# Patient Record
Sex: MALE | Race: WHITE
[De-identification: names, ages, dates, MRNs, and addresses within clinical notes are randomized per-mention and may not be internally consistent; named-entity substitution may affect disease eponyms.]

---

## 2017-06-15 ENCOUNTER — HOSPITAL ENCOUNTER (OUTPATIENT)
Dept: HOSPITAL 61 - RT | Age: 47
Discharge: HOME | End: 2017-06-15
Attending: PHYSICIAN ASSISTANT
Payer: COMMERCIAL

## 2017-06-15 DIAGNOSIS — G47.33: Primary | ICD-10-CM

## 2017-06-16 NOTE — SLEEP
DATE OF STUDY:  06/15/2017



HOME SLEEP STUDY



DATE OF STUDY:  06/15/2017



REFERRING PERSON:  Dr. Chen Mallory.



The patient is 46 years old with a BMI of 35.  Sussex score was 12.  Home sleep

study was performed by [facility].  Total recording time was 677

minutes.



During the night study, the patient had 107 obstructive apneas, 56 mixed apneas,

and no central apneas.  There were 104 hypopneas.  The patient's apnea-hypopnea

index was 24 per hour with a supine index of 58 per hour.  There was some

artifact in thoracic belt, on and off throughout, the later part of the night.



Review of nocturnal oximetry study revealed that 130 minutes were spent in

oxygen saturation of less than 90%.  The lowest was 69% with an average of 90%.



Mean heart rate was 73 beats per minute.



IMPRESSION:

1.  Moderate sleep apnea-hypopnea syndrome with worsening during supine sleep.

2.  Severe nocturnal hypoxia secondary to obstructive sleep apnea.



RECOMMENDATIONS:

1.  The patient would benefit from in-lab CPAP titration study.

2.  Once optimal CPAP pressure achieved, then follow up in 4-6 weeks to assess

compliance with CPAP and to document clinical improvement.

3.  Weight loss is strongly advised.

4.  Avoid CNS depressants.

5.  Caution regarding driving until symptoms of sleep apnea resolve with the use

of CPAP.

 



______________________________

JULIA MAYA MD DR:  FLORIDALMA/brandy  JOB#:  [id number] / [id number]

DD:  06/16/2017 12:43  DT:  06/16/2017 13:13



CHEN Valentin

## 2018-06-07 ENCOUNTER — HOSPITAL ENCOUNTER (OUTPATIENT)
Dept: HOSPITAL 61 - NM | Age: 48
Discharge: HOME | End: 2018-06-07
Attending: PHYSICIAN ASSISTANT
Payer: COMMERCIAL

## 2018-06-07 DIAGNOSIS — R42: ICD-10-CM

## 2018-06-07 DIAGNOSIS — R06.02: Primary | ICD-10-CM

## 2018-06-07 PROCEDURE — 93017 CV STRESS TEST TRACING ONLY: CPT

## 2018-06-07 PROCEDURE — 93880 EXTRACRANIAL BILAT STUDY: CPT

## 2018-07-23 ENCOUNTER — HOSPITAL ENCOUNTER (OUTPATIENT)
Dept: HOSPITAL 61 - MAMMO | Age: 48
Discharge: HOME | End: 2018-07-23
Attending: FAMILY MEDICINE
Payer: COMMERCIAL

## 2018-07-23 DIAGNOSIS — N62: Primary | ICD-10-CM

## 2018-07-23 PROCEDURE — 76641 ULTRASOUND BREAST COMPLETE: CPT

## 2018-07-23 PROCEDURE — 77066 DX MAMMO INCL CAD BI: CPT

## 2019-10-12 ENCOUNTER — HOSPITAL ENCOUNTER (INPATIENT)
Dept: HOSPITAL 61 - ER | Age: 49
LOS: 1 days | Discharge: HOME | DRG: 694 | End: 2019-10-13
Attending: INTERNAL MEDICINE | Admitting: INTERNAL MEDICINE
Payer: COMMERCIAL

## 2019-10-12 VITALS — SYSTOLIC BLOOD PRESSURE: 141 MMHG | DIASTOLIC BLOOD PRESSURE: 89 MMHG

## 2019-10-12 VITALS — SYSTOLIC BLOOD PRESSURE: 150 MMHG | DIASTOLIC BLOOD PRESSURE: 64 MMHG

## 2019-10-12 VITALS — BODY MASS INDEX: 37.03 KG/M2 | HEIGHT: 69 IN | WEIGHT: 250 LBS

## 2019-10-12 VITALS — SYSTOLIC BLOOD PRESSURE: 188 MMHG | DIASTOLIC BLOOD PRESSURE: 112 MMHG

## 2019-10-12 VITALS — DIASTOLIC BLOOD PRESSURE: 72 MMHG | SYSTOLIC BLOOD PRESSURE: 166 MMHG

## 2019-10-12 DIAGNOSIS — E11.22: ICD-10-CM

## 2019-10-12 DIAGNOSIS — Z87.442: ICD-10-CM

## 2019-10-12 DIAGNOSIS — Z83.3: ICD-10-CM

## 2019-10-12 DIAGNOSIS — N13.2: Primary | ICD-10-CM

## 2019-10-12 DIAGNOSIS — N18.3: ICD-10-CM

## 2019-10-12 DIAGNOSIS — I12.9: ICD-10-CM

## 2019-10-12 DIAGNOSIS — N17.9: ICD-10-CM

## 2019-10-12 DIAGNOSIS — I16.0: ICD-10-CM

## 2019-10-12 DIAGNOSIS — Z79.4: ICD-10-CM

## 2019-10-12 LAB
ALBUMIN SERPL-MCNC: 3.9 G/DL (ref 3.4–5)
ALBUMIN/GLOB SERPL: 1.1 {RATIO} (ref 1–1.7)
ALP SERPL-CCNC: 44 U/L (ref 46–116)
ALT SERPL-CCNC: 40 U/L (ref 16–63)
ANION GAP SERPL CALC-SCNC: 11 MMOL/L (ref 6–14)
APTT PPP: YELLOW S
AST SERPL-CCNC: 20 U/L (ref 15–37)
BACTERIA #/AREA URNS HPF: 0 /HPF
BASOPHILS # BLD AUTO: 0 X10^3/UL (ref 0–0.2)
BASOPHILS NFR BLD: 1 % (ref 0–3)
BILIRUB SERPL-MCNC: 0.3 MG/DL (ref 0.2–1)
BILIRUB UR QL STRIP: NEGATIVE
BUN SERPL-MCNC: 22 MG/DL (ref 8–26)
BUN/CREAT SERPL: 12 (ref 6–20)
CALCIUM SERPL-MCNC: 9.1 MG/DL (ref 8.5–10.1)
CHLORIDE SERPL-SCNC: 105 MMOL/L (ref 98–107)
CO2 SERPL-SCNC: 26 MMOL/L (ref 21–32)
CREAT SERPL-MCNC: 1.9 MG/DL (ref 0.7–1.3)
EOSINOPHIL NFR BLD: 0.1 X10^3/UL (ref 0–0.7)
EOSINOPHIL NFR BLD: 1 % (ref 0–3)
ERYTHROCYTE [DISTWIDTH] IN BLOOD BY AUTOMATED COUNT: 13.9 % (ref 11.5–14.5)
FIBRINOGEN PPP-MCNC: CLEAR MG/DL
GFR SERPLBLD BASED ON 1.73 SQ M-ARVRAT: 38 ML/MIN
GLOBULIN SER-MCNC: 3.7 G/DL (ref 2.2–3.8)
GLUCOSE SERPL-MCNC: 222 MG/DL (ref 70–99)
HCT VFR BLD CALC: 38.2 % (ref 39–53)
HGB BLD-MCNC: 12.6 G/DL (ref 13–17.5)
LIPASE: 70 U/L (ref 73–393)
LYMPHOCYTES # BLD: 1.8 X10^3/UL (ref 1–4.8)
LYMPHOCYTES NFR BLD AUTO: 23 % (ref 24–48)
MCH RBC QN AUTO: 28 PG (ref 25–35)
MCHC RBC AUTO-ENTMCNC: 33 G/DL (ref 31–37)
MCV RBC AUTO: 85 FL (ref 79–100)
MONO #: 0.5 X10^3/UL (ref 0–1.1)
MONOCYTES NFR BLD: 6 % (ref 0–9)
NEUT #: 5.2 X10^3/UL (ref 1.8–7.7)
NEUTROPHILS NFR BLD AUTO: 69 % (ref 31–73)
NITRITE UR QL STRIP: NEGATIVE
PH UR STRIP: 5.5 [PH]
PLATELET # BLD AUTO: 258 X10^3/UL (ref 140–400)
POTASSIUM SERPL-SCNC: 4 MMOL/L (ref 3.5–5.1)
PROT SERPL-MCNC: 7.6 G/DL (ref 6.4–8.2)
PROT UR STRIP-MCNC: NEGATIVE MG/DL
RBC # BLD AUTO: 4.49 X10^6/UL (ref 4.3–5.7)
RBC #/AREA URNS HPF: (no result) /HPF (ref 0–2)
SODIUM SERPL-SCNC: 142 MMOL/L (ref 136–145)
SQUAMOUS #/AREA URNS LPF: (no result) /LPF
UROBILINOGEN UR-MCNC: 0.2 MG/DL
WBC # BLD AUTO: 7.6 X10^3/UL (ref 4–11)
WBC #/AREA URNS HPF: 0 /HPF (ref 0–4)

## 2019-10-12 PROCEDURE — 85025 COMPLETE CBC W/AUTO DIFF WBC: CPT

## 2019-10-12 PROCEDURE — 96361 HYDRATE IV INFUSION ADD-ON: CPT

## 2019-10-12 PROCEDURE — G0378 HOSPITAL OBSERVATION PER HR: HCPCS

## 2019-10-12 PROCEDURE — 96375 TX/PRO/DX INJ NEW DRUG ADDON: CPT

## 2019-10-12 PROCEDURE — 82962 GLUCOSE BLOOD TEST: CPT

## 2019-10-12 PROCEDURE — 74176 CT ABD & PELVIS W/O CONTRAST: CPT

## 2019-10-12 PROCEDURE — 36415 COLL VENOUS BLD VENIPUNCTURE: CPT

## 2019-10-12 PROCEDURE — 81001 URINALYSIS AUTO W/SCOPE: CPT

## 2019-10-12 PROCEDURE — 96374 THER/PROPH/DIAG INJ IV PUSH: CPT

## 2019-10-12 PROCEDURE — 80053 COMPREHEN METABOLIC PANEL: CPT

## 2019-10-12 PROCEDURE — 83036 HEMOGLOBIN GLYCOSYLATED A1C: CPT

## 2019-10-12 PROCEDURE — 83690 ASSAY OF LIPASE: CPT

## 2019-10-12 RX ADMIN — ONDANSETRON PRN MG: 2 INJECTION INTRAMUSCULAR; INTRAVENOUS at 09:41

## 2019-10-12 RX ADMIN — HYDROMORPHONE HYDROCHLORIDE PRN MG: 2 INJECTION INTRAMUSCULAR; INTRAVENOUS; SUBCUTANEOUS at 04:36

## 2019-10-12 RX ADMIN — GLIMEPIRIDE SCH MG: 2 TABLET ORAL at 12:44

## 2019-10-12 RX ADMIN — ONDANSETRON PRN MG: 2 INJECTION INTRAMUSCULAR; INTRAVENOUS at 15:49

## 2019-10-12 RX ADMIN — BACITRACIN SCH MLS/HR: 5000 INJECTION, POWDER, FOR SOLUTION INTRAMUSCULAR at 23:00

## 2019-10-12 RX ADMIN — HYDROMORPHONE HYDROCHLORIDE PRN MG: 2 INJECTION INTRAMUSCULAR; INTRAVENOUS; SUBCUTANEOUS at 05:01

## 2019-10-12 RX ADMIN — TAMSULOSIN HYDROCHLORIDE SCH MG: 0.4 CAPSULE ORAL at 12:44

## 2019-10-12 RX ADMIN — LOSARTAN POTASSIUM SCH MG: 50 TABLET ORAL at 12:44

## 2019-10-12 RX ADMIN — BACITRACIN SCH MLS/HR: 5000 INJECTION, POWDER, FOR SOLUTION INTRAMUSCULAR at 09:45

## 2019-10-12 NOTE — HP
ADMIT DATE:  10/12/2019



CHIEF COMPLAINT:  Flank pain.



HISTORY OF PRESENT ILLNESS:  A 48-year-old black male, insulin-dependent

diabetic with hypertension, who came in with abrupt onset of 4 hours of left

flank and lower abdominal pain.  He has no history of ureteral stones, but CT

scan showed left hydronephrosis and no obvious stone was seen despite the

hydroureter.  His pain seems a little less at this point when compared to

admission.  He denies dysuria, fever, chills, hematuria, or other complaints.



MEDICATIONS:  He is on antihypertensives, atorvastatin, and Lantus 20 units,

which was started about 3 months ago when Trulicity failed after 1 month.  He

also takes glimepiride for diabetes.



Last A1c was 12.6 in May of this year.



ALLERGIES:  No allergies.



SOCIAL HISTORY:  .  Nonsmoker, nondrinker, physically active.



FAMILY HISTORY:  Unremarkable.



REVIEW OF SYSTEMS:  No other complaints.



OBJECTIVE:

ENT:  All within normal limits.

NECK:  No masses, nodes, or bruits.

LUNGS:  Clear.

CARDIOVASCULAR:  Regular rate.  No tachycardia.

ABDOMEN:  Mildly tender in left upper and lower quadrants.  No guarding.

EXTREMITIES:  Good pedal and radial pulses.  No joint or skin lesions.

BACK:  Tender to mild percussion over the left flank.



ASSESSMENT:  Left hydronephrosis and hydroureter, likely secondary to distal

ureteral stone that may have already passed.  Chronic kidney disease 3 is worse

than it was 5 months ago, but may be acute related to the obstruction.  Last

creatinine was 1.65.  Insulin appears to be better controlled since on low dose

Lantus about 4 months ago.



PLAN:  Continue hydration and we will follow his renal function.  Resume home

meds and he is aware that there is no urologic services available and if he has

persistent hydroureter, he will need to be transferred for ureteral

instrumentation.

 



______________________________

BENITA DICK MD



DR:  ALEX/brandy  JOB#:  [id number] / [id number]

DD:  10/12/2019 11:56  DT:  10/12/2019 12:13

## 2019-10-12 NOTE — HP
ADMIT DATE:  10/12/2019



CHIEF COMPLAINT:  Abdominal pain, nausea, urinary discomfort.



HISTORY OF PRESENT ILLNESS:  The patient is a pleasant 48-year-old male who

presented to the ER with signs and symptoms of kidney stone.  He had abdominal

pain, was on the left side.  He had some nausea and some dysuria. 

Interestingly, when we scanned his belly, he did have hydronephrosis and left

ureters enlarged, but there is no definitive stone.  One of the considerations

is that perhaps he had a recent stone, although the patient denies this.  He

also has some acute renal failure with a creatinine of 1.9.  His baseline is

apparently 0.7 and that was just a few months ago.  I discussed the case with

the ER physician.  We are going to admit the patient and consult Nephrology.



PAST MEDICAL HISTORY:  Diabetes, hypertension.



ALLERGIES:  None.



FAMILY HISTORY:  Diabetes.



SOCIAL HISTORY:  Does not drink, smoke or take drugs.  He works as a law

.  He is a captain.  He is .



MEDICATIONS:  Reviewed, please refer to the MRAD.



REVIEW OF SYSTEMS:

GENERAL:  No history of weight change, weakness or fevers.

SKIN:  No bruising, hair changes or rashes.

EYES:  No blurred, double or loss of vision.

NOSE AND THROAT:  No history of nosebleeds, hoarseness or sore throat.

HEART:  No history of palpitations, chest pain or shortness of breath on

exertion.

LUNGS:  Denies cough, hemoptysis, wheezing or shortness of breath.

GASTROINTESTINAL:  He complains of abdominal pain radiating into the groin.

GENITOURINARY:  He complains of some dysuria.

NEUROLOGIC:  Denies history of numbness, tingling, tremor or weakness.

PSYCHIATRIC:  No history of panic, anxiety or depression.

ENDOCRINE:  No history of heat or cold intolerance, polyuria or polydipsia.

EXTREMITIES:  Denies muscle weakness, joint pain, pain on walking or stiffness.



PHYSICAL EXAMINATION:

VITALS:  Within normal limits and are stable.

GENERAL:  No apparent distress.  Alert and oriented.

HEENT:  Head is normocephalic, atraumatic, pupils were equally round and

reactive to light and accommodation.

NECK:  Supple, no JVD, no thyromegaly was noted.

LUNGS:  Clear to auscultation in all lung fields without rhonchi or wheezing.

HEART:  RRR, S1, S2 present.  Peripheral pulses intact, no obvious murmurs were

noted.

ABDOMEN:  Soft, nontender.  Positive bowel sounds no organomegaly, normal bowel

sounds.

EXTREMITIES:  Without any cyanosis, clubbing, or edema.  Pedal pulses intact,

Homans sign is negative.

NEUROLOGIC:  Normal speech, normal tone.  A & O x3, moves all extremities, no

obvious focal deficits.

PSYCHIATRIC:  Normal affect, normal mood.  Stable.

SKIN:  No ulcerations or rashes, good skin turgor, no jaundice.

VASCULAR:  Good capillary refill, neurovascular bundle appears to be intact.



IMAGING:  CT of the abdomen shows left swollen and enlarged kidney with

perinephric and periureteral stranding with hydronephrosis.  Interestingly,

there is no stone found.  Considerations for a recently passed stone must be

considered.  The left kidney has some cyst and he also has some hepatic

steatosis.  Urinalysis shows trace blood.



LABORATORY DATA:  Electrolytes were normal except for creatinine of 1.9. 

Hematology is basically normal other than hemoglobin of 12.6.



ASSESSMENT AND PLAN:  Hydronephrosis and some hematuria and his urinalysis, with

abdominal pain and dysuria.  Suspect he probably may have passed recent kidney

stone.  We are going to consult Nephrology, give him IV fluids, p.r.n.

narcotics, p.r.n. Zofran, home meds, deep venous thrombosis prophylaxis.  Full

code.  Consult Gastroenterology for a second opinion.

 



______________________________

EDIE LOU DO



DR:  RANDI/brandy  JOB#:  [id number] / [id number]

DD:  10/12/2019 10:56  DT:  10/12/2019 11:22

## 2019-10-12 NOTE — PHYS DOC
Adult General


Chief Complaint


Chief Complaint:  ABDOMINAL PAIN





HPI


HPI





Patient is 48-year-old male who presents with complaint of left-sided abdominal 

pain that radiates into his groin and testicle. He states that pain started 

about 3 hours ago. He denies any urinary discomfort. He rates his pain to be a 

10 out of 10 and states that he has been nauseated. Patient states that nothing 

seems to worsen or improve the pain.[]





Review of Systems


Review of Systems





Constitutional: Denies fever or chills []


Respiratory: Denies cough or shortness of breath []


Cardiovascular: No additional information not addressed in HPI []


GI: Complains of left lower abdominal pain with nausea. Denies vomiting or 

diarrhea []


: Denies dysuria or hematuria []


Musculoskeletal: Denies back pain or joint pain []





All other systems were reviewed and found to be within normal limits, except as 

documented in this note.





Current Medications


Current Medications





Current Medications








 Medications


  (Trade)  Dose


 Ordered  Sig/Jeni  Start Time


 Stop Time Status Last Admin


Dose Admin


 


 Hydralazine HCl


  (Apresoline Inj)  10 mg  1X  ONCE  10/12/19 06:00


 10/12/19 06:01   





 


 Hydromorphone HCl


  (Dilaudid)  2 mg  STK-MED ONCE  10/12/19 04:32


 10/12/19 04:33 DC  





 


 Morphine Sulfate


  (Morphine


 Sulfate)  5 mg  1X  ONCE  10/12/19 06:00


 10/12/19 06:01   





 


 Ondansetron HCl


  (Zofran)  4 mg  STK-MED ONCE  10/12/19 04:32


 10/12/19 04:32 DC  





 


 Sodium Chloride  1,000 ml @ 


 1,000 mls/hr  Q1H  10/12/19 05:00


 10/12/19 05:59  10/12/19 04:37


1,000 MLS/HR











Allergies


Allergies





Allergies








Coded Allergies Type Severity Reaction Last Updated Verified


 


  No Known Drug Allergies    10/12/19 No











Physical Exam


Physical Exam





Constitutional: Well developed, well nourished, in mild distress, non-toxic 

appearance. []


HENT: Normocephalic, atraumatic, bilateral external ears normal, oropharynx 

moist, no oral exudates, nose normal. []


Eyes: PERRLA, EOMI, conjunctiva normal, no discharge. [] 


Neck: Normal range of motion, no tenderness, supple, no stridor. [] 


Cardiovascular: Regular rate and rhythm[]


Lungs & Thorax:  Bilateral breath sounds clear to auscultation []


Abdomen: Bowel sounds normal, soft, with tenderness to palpation in the left 

lower abdomen. [] 


Skin: Warm, dry, no erythema, no rash. [] 


Extremities: No tenderness, no cyanosis, no clubbing, ROM intact, no edema. [] 


Neurologic: Alert and oriented X 3, no focal deficits noted. []





Current Patient Data


Vital Signs





                                   Vital Signs








  Date Time  Temp Pulse Resp B/P (MAP) Pulse Ox O2 Delivery O2 Flow Rate FiO2


 


10/12/19 05:01   14  97 Room Air  


 


10/12/19 04:13 97.9 72  213/102 (139)    





 97.9       








Lab Values





                                Laboratory Tests








Test


 10/12/19


04:09 10/12/19


04:22


 


Urine Collection Type Unknown   


 


Urine Color Yellow   


 


Urine Clarity Clear   


 


Urine pH 5.5   


 


Urine Specific Gravity 1.020   


 


Urine Protein


 Negative mg/dL


(NEG-TRACE) 





 


Urine Glucose (UA)


 >=1000 mg/dL


(NEG) 





 


Urine Ketones (Stick)


 Negative mg/dL


(NEG) 





 


Urine Blood Trace (NEG)   


 


Urine Nitrite


 Negative (NEG)


 





 


Urine Bilirubin


 Negative (NEG)


 





 


Urine Urobilinogen Dipstick


 0.2 mg/dL (0.2


mg/dL) 





 


Urine Leukocyte Esterase


 Negative (NEG)


 





 


Urine RBC


 3-5 /HPF (0-2)


 





 


Urine WBC 0 /HPF (0-4)   


 


Urine Squamous Epithelial


Cells Few /LPF  


 





 


Urine Bacteria


 0 /HPF (0-FEW)


 





 


Urine Mucus Slight /LPF   


 


White Blood Count


 


 7.6 x10^3/uL


(4.0-11.0)


 


Red Blood Count


 


 4.49 x10^6/uL


(4.30-5.70)


 


Hemoglobin


 


 12.6 g/dL


(13.0-17.5)  L


 


Hematocrit


 


 38.2 %


(39.0-53.0)  L


 


Mean Corpuscular Volume


 


 85 fL ()





 


Mean Corpuscular Hemoglobin  28 pg (25-35)  


 


Mean Corpuscular Hemoglobin


Concent 


 33 g/dL


(31-37)


 


Red Cell Distribution Width


 


 13.9 %


(11.5-14.5)


 


Platelet Count


 


 258 x10^3/uL


(140-400)


 


Neutrophils (%) (Auto)  69 % (31-73)  


 


Lymphocytes (%) (Auto)  23 % (24-48)  L


 


Monocytes (%) (Auto)  6 % (0-9)  


 


Eosinophils (%) (Auto)  1 % (0-3)  


 


Basophils (%) (Auto)  1 % (0-3)  


 


Neutrophils # (Auto)


 


 5.2 x10^3/uL


(1.8-7.7)


 


Lymphocytes # (Auto)


 


 1.8 x10^3/uL


(1.0-4.8)


 


Monocytes # (Auto)


 


 0.5 x10^3/uL


(0.0-1.1)


 


Eosinophils # (Auto)


 


 0.1 x10^3/uL


(0.0-0.7)


 


Basophils # (Auto)


 


 0.0 x10^3/uL


(0.0-0.2)


 


Sodium Level


 


 142 mmol/L


(136-145)


 


Potassium Level


 


 4.0 mmol/L


(3.5-5.1)


 


Chloride Level


 


 105 mmol/L


()


 


Carbon Dioxide Level


 


 26 mmol/L


(21-32)


 


Anion Gap  11 (6-14)  


 


Blood Urea Nitrogen


 


 22 mg/dL


(8-26)


 


Creatinine


 


 1.9 mg/dL


(0.7-1.3)  H


 


Estimated GFR


(Cockcroft-Gault) 


 38.0  





 


BUN/Creatinine Ratio  12 (6-20)  


 


Glucose Level


 


 222 mg/dL


(70-99)  H


 


Calcium Level


 


 9.1 mg/dL


(8.5-10.1)


 


Total Bilirubin


 


 0.3 mg/dL


(0.2-1.0)


 


Aspartate Amino Transferase


(AST) 


 20 U/L (15-37)





 


Alanine Aminotransferase (ALT)


 


 40 U/L (16-63)





 


Alkaline Phosphatase


 


 44 U/L


()  L


 


Total Protein


 


 7.6 g/dL


(6.4-8.2)


 


Albumin


 


 3.9 g/dL


(3.4-5.0)


 


Albumin/Globulin Ratio  1.1 (1.0-1.7)  


 


Lipase


 


 70 U/L


()  L





                                Laboratory Tests


10/12/19 04:22








                                Laboratory Tests


10/12/19 04:22











EKG


EKG


[]





Radiology/Procedures


Radiology/Procedures


[]





Course & Med Decision Making


Course & Med Decision Making


Pertinent Labs and Imaging studies reviewed. (See chart for details)





[]





Dragon Disclaimer


Dragon Disclaimer


This electronic medical record was generated, in whole or in part, using a voice

 recognition dictation system.





Departure


Departure


Impression:  


   Primary Impression:  


   Abdominal pain


   Additional Impressions:  


   Acute kidney injury


   Hypertensive urgency


Disposition:  09 ADMITTED AS INPATIENT


Admitting Physician:  HIMS (Tyler)


Condition:  IMPROVED


Referrals:  


DIANE BARTLETT MD (PCP)





Problem Qualifiers








   Primary Impression:  


   Abdominal pain


   Abdominal location:  left lower quadrant  Qualified Codes:  R10.32 - Left 

   lower quadrant pain








CATHRYN AUGUSTE Jr. DO          Oct 12, 2019 04:22

## 2019-10-12 NOTE — RAD
Study: CT abdomen and pelvis without contrast

 

Indication: Left flank pain.

 

Comparison: None.

 

Technique: Helical CT imaging performed of the abdomen and pelvis without 

the use of intravenous contrast. Sagittal and coronal reformats were 

obtained. 

 

One or more of the following individualized dose reduction techniques were

utilized for this examination:  

 

1. Automated exposure control

2. Adjustment of the mA and/or kV according to patient size

3. Use of iterative reconstruction technique.

 

Findings:

 

Right middle lobe calcified granuloma.

 

Diffuse low-attenuation of the liver compatible with hepatic steatosis. 

Fatty sparing seen along the gallbladder fossa. The gallbladder, pancreas,

spleen and adrenal glands are unremarkable.

 

Intrinsically hyperdense focus at the upper pole the right kidney, image 

83 series 2, likely a hemorrhagic renal cyst. Adjacent low attenuation 

focus is incompletely characterized but exhibits a density of less than 10

Hounsfield units most compatible with a simple cyst. Small exophytic focus

off the lower pole the right kidney is too small to definitively 

characterize but likely represents a cyst as well. Additional 

intrinsically hyperdense focus at the inferior pole of the right kidney, 

image 124 series 2. No right-sided hydroureteronephrosis.

 

Edematous and enlarged left kidney with hydroureteronephrosis. Perinephric

and periureteral fat stranding is present as well.. Despite this 

appearance suggesting obstruction, a calcified nephrolithiasis is not seen

along the course of the ureter or within the urinary bladder. Note is made

that the caliber of the left ureter remains essentially the same 

throughout its course and no transition point is seen to suggest a site of

stricturing.

 

Unremarkable prostate gland. The colon is unremarkable. Nonobstructed 

small bowel. Incomplete evaluation of the stomach due to partial collapse.

Taking this into consideration, no discrete normality seen.

 

Normal caliber of the abdominal aorta. No free fluid or air.

 

Ill-defined subcutaneous soft tissue density at the left lower quadrant 

could represent a site of subcutaneous injection. A similar though less 

pronounced appearance is present at the right upper quadrant anteriorly. 

Asymmetry of the dorsal paraspinous soft tissues lateralized to the left 

at the L3-L4 level may be related to prior surgery.

 

Grade 1 anterolisthesis of L4 on L5 relating to advanced facet 

degeneration. Straightening of lumbar lordosis. Facet degeneration is also

pronounced at L5-S1. Bony neural foraminal encroachment most pronounced 

bilaterally at L4-L5.

 

Impression:

1.  Edematous and enlarged left kidney with perinephric and periureteral 

fat stranding as well as hydroureteronephrosis. Despite this appearance 

suggesting obstructive nephropathy, no obstructing nephrolithiasis is seen

throughout the ureter or within the urinary bladder. No transitioning of 

caliber of the left ureter to suggest stricture though this is a 

consideration. Additional consideration is a recently passed stone.

2.  Incompletely evaluated right kidney lesions likely representing a 

mixture of hemorrhagic/proteinaceous and simple cysts. Eventual 

nonemergent renal sonography could be considered to definitively 

characterize.

3.  Hepatic steatosis.

 

Electronically signed by: TAZ GONZALES MD (10/12/2019 5:29 AM) 

[facility]

## 2019-10-13 VITALS — DIASTOLIC BLOOD PRESSURE: 72 MMHG | SYSTOLIC BLOOD PRESSURE: 130 MMHG

## 2019-10-13 VITALS — DIASTOLIC BLOOD PRESSURE: 79 MMHG | SYSTOLIC BLOOD PRESSURE: 136 MMHG

## 2019-10-13 VITALS — SYSTOLIC BLOOD PRESSURE: 136 MMHG | DIASTOLIC BLOOD PRESSURE: 79 MMHG

## 2019-10-13 LAB
ALBUMIN SERPL-MCNC: 4 G/DL (ref 3.4–5)
ALBUMIN/GLOB SERPL: 1 {RATIO} (ref 1–1.7)
ALP SERPL-CCNC: 48 U/L (ref 46–116)
ALT SERPL-CCNC: 48 U/L (ref 16–63)
ANION GAP SERPL CALC-SCNC: 9 MMOL/L (ref 6–14)
AST SERPL-CCNC: 20 U/L (ref 15–37)
BILIRUB SERPL-MCNC: 0.8 MG/DL (ref 0.2–1)
BUN SERPL-MCNC: 14 MG/DL (ref 8–26)
BUN/CREAT SERPL: 10 (ref 6–20)
CALCIUM SERPL-MCNC: 9.3 MG/DL (ref 8.5–10.1)
CHLORIDE SERPL-SCNC: 105 MMOL/L (ref 98–107)
CO2 SERPL-SCNC: 27 MMOL/L (ref 21–32)
CREAT SERPL-MCNC: 1.4 MG/DL (ref 0.7–1.3)
GFR SERPLBLD BASED ON 1.73 SQ M-ARVRAT: 54.1 ML/MIN
GLOBULIN SER-MCNC: 3.9 G/DL (ref 2.2–3.8)
GLUCOSE SERPL-MCNC: 124 MG/DL (ref 70–99)
HBA1C MFR BLD: 7.2 % (ref 4.8–5.6)
POTASSIUM SERPL-SCNC: 3.8 MMOL/L (ref 3.5–5.1)
PROT SERPL-MCNC: 7.9 G/DL (ref 6.4–8.2)
SODIUM SERPL-SCNC: 141 MMOL/L (ref 136–145)

## 2019-10-13 RX ADMIN — GLIMEPIRIDE SCH MG: 2 TABLET ORAL at 08:56

## 2019-10-13 RX ADMIN — LOSARTAN POTASSIUM SCH MG: 50 TABLET ORAL at 08:56

## 2019-10-13 RX ADMIN — TAMSULOSIN HYDROCHLORIDE SCH MG: 0.4 CAPSULE ORAL at 08:56

## 2019-10-13 NOTE — DS
DATE OF DISCHARGE:  10/13/2019



HOSPITAL SUMMARY:  A 48-year-old black male with hypertension and diabetes, came

in with a fairly abrupt onset of left-sided flank and lower abdominal pain. 

Urine showed large glucose with no blood or infection.  CBC and chemistry

profile normal except for creatinine of 1.9 and GFR around 40.  CT scan showed

evidence of left hydronephrosis and hydroureter with ureter edematous all the

way to the bladder, but no stone was seen or other masses.  No evidence of

ureteral stricture was present.  Day after admission, his creatinine had come

down to 1.4, GFR of about 54, which is normal baseline for him as far back as

2018 per old lab.  He was given IV fluids, Flomax, pain medication, and

supportive care.  His pain started to subside about 12 hours after admission and

now feels much better.  He is afebrile.  Vitals are normal and he is comfortable

to be followed as an outpatient, particularly since his acute kidney injury has

resolved as well.



FINAL DIAGNOSES:

1.  Left hydronephrosis and hydroureter, likely secondary to transient ureteral

stone.

2.  Acute kidney injury, resolved.

3.  Chronic kidney disease 3, stable.

4.  Type 2 diabetes mellitus.



OPERATIONS, PROCEDURES, COMPLICATIONS, CONSULTATIONS:  None.



DISPOSITION:  His A1c at this admission was 7.2.  He has been on Lantus 20 units

per work comp doctor for about 3 months.  His A1c jumped up in May after

stopping glimepiride in March, so we will keep the glimepiride 1 mg daily and

stop the Lantus and see how he does.  He could always increase his glimepiride

to 2 mg if sugars go up or resume low-dose Lantus which ever would be preferable

to time.  The patient is aware of this strategy and the fact that stopping

glimepiride as a trial in March may have been the reason primarily why his A1c

went up and indicating the drug is certainly effective, but puts him at risk for

hypoglycemia at the same time.  Good fluid intake, low salt intake, continued

efforts at weight loss to control diabetes.  Office followup with Dr. Bonds or

myself in 1 week.  Follow renal function as well.  Note, the calcium and

phosphate levels were normal as possible etiology for his stone.

 



______________________________

BENITA DICK MD



DR:  Herson  JOB#:  [id number] / [id number]

DD:  10/13/2019 09:50  DT:  10/13/2019 10:01

## 2019-10-13 NOTE — PDOC
[employer] PROGRESS NOTE


Chief Complaint


Chief Complaint


Abdominal pain


Hypertensive urgency


HANG





History of Present Illness


History of Present Illness


10/13/19


Pt was seen and examined at bedside





Vitals/I&O


Vitals/I&O:





                                   Vital Signs








  Date Time  Temp Pulse Resp B/P (MAP) Pulse Ox O2 Delivery O2 Flow Rate FiO2


 


10/13/19 08:57  68  136/79    


 


10/13/19 07:00 98.4  18  95 Room Air  





 98.4       














                                    I & O   


 


 10/12/19 10/12/19 10/13/19





 15:00 23:00 07:00


 


Intake Total   400 ml


 


Balance   400 ml











Physical Exam


General:  Alert, Oriented X3, Cooperative


Heart:  Regular rate


Lungs:  Clear


Abdomen:  Other


Extremities:  No clubbing, No cyanosis


Skin:  No rashes, No breakdown





Labs


Labs:





Laboratory Tests








Test


 10/12/19


12:02 10/12/19


16:49 10/12/19


20:12 10/13/19


06:16


 


Glucose (Fingerstick)


 124 mg/dL


(70-99) 125 mg/dL


(70-99) 90 mg/dL


(70-99) 





 


Sodium Level


 


 


 


 141 mmol/L


(136-145)


 


Potassium Level


 


 


 


 3.8 mmol/L


(3.5-5.1)


 


Chloride Level


 


 


 


 105 mmol/L


()


 


Carbon Dioxide Level


 


 


 


 27 mmol/L


(21-32)


 


Anion Gap    9 (6-14) 


 


Blood Urea Nitrogen


 


 


 


 14 mg/dL


(8-26)


 


Creatinine


 


 


 


 1.4 mg/dL


(0.7-1.3)


 


Estimated GFR


(Cockcroft-Gault) 


 


 


 54.1 





 


BUN/Creatinine Ratio    10 (6-20) 


 


Glucose Level


 


 


 


 124 mg/dL


(70-99)


 


Calcium Level


 


 


 


 9.3 mg/dL


(8.5-10.1)


 


Total Bilirubin


 


 


 


 0.8 mg/dL


(0.2-1.0)


 


Aspartate Amino Transf


(AST/SGOT) 


 


 


 20 U/L (15-37) 





 


Alanine Aminotransferase


(ALT/SGPT) 


 


 


 48 U/L (16-63) 





 


Alkaline Phosphatase


 


 


 


 48 U/L


()


 


Total Protein


 


 


 


 7.9 g/dL


(6.4-8.2)


 


Albumin


 


 


 


 4.0 g/dL


(3.4-5.0)


 


Albumin/Globulin Ratio    1.0 (1.0-1.7) 











Assessment and Plan


Assessmemt and Plan


Problems


Medical Problems:


(1) Abdominal pain


Status: Acute  





(2) Acute kidney injury


Status: Acute  





(3) Hypertensive urgency


Status: Acute  





Assessment


Acute kidney injury


Hydronephrosis


Hypertensive urgency


DM 2





Plan








Comment


Review of Relevant


I have reviewed the following items srinivasa (where applicable) has been applied.


Medications:





Current Medications








 Medications


  (Trade)  Dose


 Ordered  Sig/Jeni


 Route


 PRN Reason  Start Time


 Stop Time Status Last Admin


Dose Admin


 


 Sodium Chloride  1,000 ml @ 


 75 mls/hr  [id number]


 IV


   10/12/19 10:00


 10/13/19 09:46 DC 10/12/19 23:00





 


 Hydromorphone HCl


  (Dilaudid)  1 mg  PRN Q2HR  PRN


 IV/SQ


 PAIN GREATER THAN 3/10  10/12/19 11:30


 10/13/19 09:46 DC 10/12/19 12:43





 


 Glimepiride


  (Amaryl)  1 mg  DAILY


 PO


   10/12/19 12:00


    10/13/19 08:56





 


 Amlodipine


 Besylate


  (Norvasc)  5 mg  DAILY


 PO


   10/12/19 12:00


    10/13/19 08:57





 


 Losartan Potassium


  (Cozaar)  100 mg  DAILY


 PO


   10/12/19 12:00


    10/13/19 08:56





 


 Tamsulosin HCl


  (Flomax)  0.4 mg  DAILY


 PO


   10/12/19 12:00


 10/13/19 09:46 DC 10/13/19 08:56





 


 Hydrochlorothiazide


  (Microzide)  12.5 mg  1X  ONCE


 PO


   10/12/19 16:30


 10/13/19 09:46 DC 10/12/19 17:04





 


 Hydrochlorothiazide


  (Microzide)  12.5 mg  DAILY


 PO


   10/13/19 09:00


 10/13/19 09:46 DC 10/13/19 08:57





 


 Hydromorphone HCl


  (Dilaudid)  0.6 mg  1X  ONCE


 IV


   10/12/19 16:00


 10/13/19 09:46 DC 10/12/19 17:14




















EDIE LOU III DO           Oct 13, 2019 09:58